# Patient Record
Sex: MALE | Race: BLACK OR AFRICAN AMERICAN | Employment: FULL TIME | URBAN - METROPOLITAN AREA
[De-identification: names, ages, dates, MRNs, and addresses within clinical notes are randomized per-mention and may not be internally consistent; named-entity substitution may affect disease eponyms.]

---

## 2022-02-25 ENCOUNTER — APPOINTMENT (OUTPATIENT)
Dept: GENERAL RADIOLOGY | Age: 47
End: 2022-02-25
Attending: PHYSICIAN ASSISTANT

## 2022-02-25 ENCOUNTER — HOSPITAL ENCOUNTER (EMERGENCY)
Age: 47
Discharge: HOME | End: 2022-02-25
Attending: STUDENT IN AN ORGANIZED HEALTH CARE EDUCATION/TRAINING PROGRAM

## 2022-02-25 VITALS
BODY MASS INDEX: 26.79 KG/M2 | SYSTOLIC BLOOD PRESSURE: 135 MMHG | OXYGEN SATURATION: 100 % | HEART RATE: 78 BPM | TEMPERATURE: 98.2 F | HEIGHT: 76 IN | DIASTOLIC BLOOD PRESSURE: 74 MMHG | WEIGHT: 220 LBS | RESPIRATION RATE: 16 BRPM

## 2022-02-25 DIAGNOSIS — T07.XXXA MUSCLE STRAIN, MULTIPLE SITES: ICD-10-CM
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
## 2022-02-25 PROCEDURE — 73140 X-RAY EXAM OF FINGER(S): CPT

## 2022-02-25 PROCEDURE — 72050 X-RAY EXAM NECK SPINE 4/5VWS: CPT

## 2022-02-25 PROCEDURE — 74011250637 HC RX REV CODE- 250/637: Performed by: PHYSICIAN ASSISTANT

## 2022-02-25 PROCEDURE — 99283 EMERGENCY DEPT VISIT LOW MDM: CPT

## 2022-02-25 PROCEDURE — 71100 X-RAY EXAM RIBS UNI 2 VIEWS: CPT

## 2022-02-25 PROCEDURE — 73030 X-RAY EXAM OF SHOULDER: CPT

## 2022-02-25 RX ORDER — CYCLOBENZAPRINE HCL 10 MG
10 TABLET ORAL
Status: COMPLETED | OUTPATIENT
Start: 2022-02-25 | End: 2022-02-25

## 2022-02-25 RX ORDER — CYCLOBENZAPRINE HCL 10 MG
10 TABLET ORAL
Qty: 15 TABLET | Refills: 0 | Status: SHIPPED | OUTPATIENT
Start: 2022-02-25 | End: 2022-03-02

## 2022-02-25 RX ORDER — OXYCODONE HYDROCHLORIDE 5 MG/1
5 TABLET ORAL
Status: COMPLETED | OUTPATIENT
Start: 2022-02-25 | End: 2022-02-25

## 2022-02-25 RX ORDER — ACETAMINOPHEN 500 MG
1000 TABLET ORAL
Qty: 30 TABLET | Refills: 0 | Status: SHIPPED | OUTPATIENT
Start: 2022-02-25

## 2022-02-25 RX ORDER — ACETAMINOPHEN 500 MG
1000 TABLET ORAL ONCE
Status: COMPLETED | OUTPATIENT
Start: 2022-02-25 | End: 2022-02-25

## 2022-02-25 RX ORDER — OXYCODONE HYDROCHLORIDE 5 MG/1
5 TABLET ORAL
Qty: 5 TABLET | Refills: 0 | Status: SHIPPED | OUTPATIENT
Start: 2022-02-25 | End: 2022-02-27

## 2022-02-25 RX ADMIN — OXYCODONE 5 MG: 5 TABLET ORAL at 11:18

## 2022-02-25 RX ADMIN — CYCLOBENZAPRINE HYDROCHLORIDE 10 MG: 10 TABLET, FILM COATED ORAL at 11:17

## 2022-02-25 RX ADMIN — ACETAMINOPHEN 1000 MG: 500 TABLET ORAL at 09:54

## 2022-02-25 NOTE — Clinical Note
[address]  [facility] EMERGENCY DEPT  [address] [address]  [phone]    Work/School Note    Date: 2/25/2022    To Whom It May concern:    Griselda Skates was seen and treated today in the emergency room by the following provider(s):  Attending Provider: Bethanie Dandy, MD  Physician Assistant: Madison Dexter. Griselda Skates is excused from work/school on 2/25/2022 through 2/27/2022. He is medically clear to return to work/school on 2/28/2022.          Sincerely,          Madison Bernal

## 2022-02-25 NOTE — ED PROVIDER NOTES
EMERGENCY DEPARTMENT HISTORY AND PHYSICAL EXAM      Date: 2/25/2022  Patient Name: Remington Diamond    History of Presenting Illness     Chief Complaint   Patient presents with   Sheyla Tovar Fall     a grate gave way and he fell when at work he caught the fall with his arms. pain right side ribs. no loc. pain fifth finger as well       History Provided By: Patient. Patient's wife arrived after initial evaluation. HPI: Remington Diamond, 56 y.o. male with significant PMHx for CKD presents BIBA to the ED with cc of fall that occurred at work. 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 Patient reports falling into a hole that went up to about his armpits. No head injury or LOC. He complains of pain at the right fifth digit and feels like it may be broken. He also complains of some soreness along the right posterior ribs. Denies headache, dizziness, nausea/vomiting, chest pain, shortness of breath, abdominal pain. He is not anticoagulated. No medications prior to arrival. [facility] of R rotator cuff surgery with surgeon at [address] [place] [address]. Patient does not take NSAIDs due to CKD, which is reportedly stage 2. There are no other complaints, changes, or physical findings at this time. PCP: Jaguar Romero MD    No current facility-administered medications on file prior to encounter. No current outpatient medications on file prior to encounter. Past History     Past Medical History:  No past medical history on file. Past Surgical History:  No past surgical history on file. Family History:  No family history on file.     Social History:  Social History     Tobacco Use    Smoking status: Not on file    Smokeless tobacco: Not on file   Substance Use Topics    Alcohol use: Not on file    Drug use: Not on file       Allergies:  No Known Allergies      Review of Systems   Review of Systems Constitutional: Negative for diaphoresis. HENT: Negative for voice change. Eyes: Negative for redness. Respiratory: Negative for shortness of breath. Cardiovascular: Negative for chest pain. Gastrointestinal: Negative for abdominal pain and vomiting. Musculoskeletal: Positive for arthralgias and back pain. Skin: Negative for wound. Allergic/Immunologic: Negative for immunocompromised state. Neurological: Negative for dizziness. Hematological: Does not bruise/bleed easily. Psychiatric/Behavioral: Negative for agitation. Physical Exam   Physical Exam  Vitals and nursing note reviewed. Constitutional:       General: He is not in acute distress. Appearance: Normal appearance. He is not toxic-appearing. Comments: Appears comfortable, sitting upright on stretcher eating an orange   HENT:      Head: Normocephalic and atraumatic. Nose: Nose normal.      Mouth/Throat:      Mouth: Mucous membranes are moist.   Eyes:      Extraocular Movements: Extraocular movements intact. Conjunctiva/sclera: Conjunctivae normal.      Pupils: Pupils are equal, round, and reactive to light. Neck:      Trachea: Phonation normal.   Cardiovascular:      Rate and Rhythm: Normal rate and regular rhythm. Pulmonary:      Effort: Pulmonary effort is normal.      Breath sounds: Normal breath sounds. Abdominal:      Palpations: Abdomen is soft. Tenderness: There is no abdominal tenderness. There is no guarding or rebound. Musculoskeletal:         General: Normal range of motion. Cervical back: Normal range of motion and neck supple. No rigidity or tenderness. Comments: 5/5 strength and sensation b/l UE/LEs. Gait is steady and symmetrical.  Generalized pain of the right fifth digit reported. There is no deformity or point tenderness. Full range of motion of the digit is performed with pain. No midline tenderness along length of spine.   Pain reported over the right lateral ribs.  There is no crepitus, step-off, or ecchymosis. Skin:     General: Skin is warm and dry. Neurological:      General: No focal deficit present. Mental Status: He is alert and oriented to person, place, and time. GCS: GCS eye subscore is 4. GCS verbal subscore is 5. GCS motor subscore is 6. Cranial Nerves: No cranial nerve deficit. Motor: No weakness. Coordination: Coordination normal.      Gait: Gait normal.   Psychiatric:         Mood and Affect: Mood normal.         Behavior: Behavior normal.         Diagnostic Study Results     Labs -   No results found for this or any previous visit (from the past 12 hour(s)). Radiologic Studies -   XR 5TH FINGER RT MIN 2 V   Final Result   No acute abnormality. XR RIBS RT UNI 2 V   Final Result   No displaced fracture. XR SPINE CERV 4 OR 5 V   Final Result   Degenerative disease as described above. XR SHOULDER RT AP/LAT MIN 2 V   Final Result   No acute abnormality. CT Results  (Last 48 hours)    None        CXR Results  (Last 48 hours)    None            Medical Decision Making   I am the first provider for this patient. I reviewed the vital signs, available nursing notes, past medical history, past surgical history, family history and social history. Vital Signs-Reviewed the patient's vital signs. Patient Vitals for the past 12 hrs:   Temp Pulse Resp BP SpO2   02/25/22 1057 98.2 °F (36.8 °C) 78 16 135/74 --   02/25/22 0851 98.1 °F (36.7 °C) 78 14 (!) 142/88 100 %       Records Reviewed: Nursing Notes and Old Medical Records    Provider Notes (Medical Decision Making):     ED Course:   Initial assessment performed. The patients presenting problems have been discussed, and they are in agreement with the care plan formulated and outlined with them. I have encouraged them to ask questions as they arise throughout their visit.     ED Course as of 02/25/22 1206   Fri Feb 25, 2022   8650 Patient's wife is requesting additional imaging and pain medication. She states \"I know my . \"  She is requesting Tylenol 3, as well as x-rays of the neck and right shoulder. Patient appears comfortable he is sitting upright eating an orange and appears to be in no acute distress. Long discussion regarding risks/benefits/side effects of additional films and medications. We will add on x-rays of the neck and shoulder, hold off on additional analgesics at this time as patient was just given 1 g of Tylenol. [AS]   4607 Discussed x-ray findings with patient and his wife. They are agreeable to discharge home. Discussed expected evolution of symptoms. Recommended Tylenol, Flexeril, use of heating pad and supportive care at home. Patient's wife declines Voltaren gel, citing kidney concerns. Patient's wife is requesting something stronger to have on hand just in case. Will prescribe a short course of oxycodone to be added on as needed, advised on safe use. Follow-up with PCP. ED return precautions given.   [AS]   6145 Upon discharge, pt and wife are now requesting more pain meds. They also inquire about a sling. I advised against a sling d/t risk of frozen shoulder. I encouraged frequent ROM exercises. Will give oxycodone 5 mg and flexeril.  [AS]      ED Course User Index  [AS] SYDNIE Mejia       Critical Care Time: None    Disposition:  D/c    PLAN:  1. Discharge Medication List as of 2/25/2022 10:59 AM      START taking these medications    Details   acetaminophen (Tylenol Extra Strength) 500 mg tablet Take 2 Tablets by mouth every six (6) hours as needed for Pain., Normal, Disp-30 Tablet, R-0      cyclobenzaprine (FLEXERIL) 10 mg tablet Take 1 Tablet by mouth three (3) times daily as needed for Muscle Spasm(s) for up to 5 days. , Normal, Disp-15 Tablet, R-0      oxyCODONE IR (Roxicodone) 5 mg immediate release tablet Take 1 Tablet by mouth every eight (8) hours as needed for Pain for up to 2 days.  Max Daily Amount: 15 mg., Normal, Disp-5 Tablet, R-0           2. Follow-up Information     Follow up With Specialties Details Why Contact Info    [facility] EMERGENCY DEPT Emergency Medicine  As needed, If symptoms worsen [address] [facility] [address]    Beatriz Love MD Family Medicine Call  For follow up [address] [facility]  [place] [address] [address]  [facility]  Call  For follow up if needed [address]  [address] [facility],[address]  [address] [facility]  [phone]        Return to ED if worse     Diagnosis     Clinical Impression:   1. Fall from height of greater than 3 feet    2. Muscle strain, multiple sites          Please note that this dictation was completed with [employer], the computer voice recognition software. Quite often unanticipated grammatical, syntax, homophones, and other interpretive errors are inadvertently transcribed by the computer software. Please disregards these errors. Please excuse any errors that have escaped final proofreading.

## 2022-02-25 NOTE — ED NOTES
Entered patient room to discharge patient. Patient wife verbalized that her  was having worsened pain in his right shoulder and neck. Patient was asked to confirm his wife concerns. Patient stated his right shoulder is painful rating it 8/10 on pain scale. Patient denies any numbness and tingling sensation. Patient's wife asked to speak to attending PA Carleen Brock who was informed by RN. PA entered room and reassure patient and wife about his right arm injury, results of xray, medication, and to follow up with ortho. Patient was further medicated with pain medication and muscle relaxer (see MAR) and discharge instruction explained to both patient and wife, and both verbalizing understanding of instructions. Patient given discharge papers and left to dress for home.

## 2022-03-30 ENCOUNTER — TRANSCRIBE ORDER (OUTPATIENT)
Dept: SCHEDULING | Age: 47
End: 2022-03-30

## 2022-03-30 DIAGNOSIS — S20.211A CONTUSION OF RIGHT FRONT WALL OF THORAX: Primary | ICD-10-CM

## 2022-04-04 ENCOUNTER — TRANSCRIBE ORDER (OUTPATIENT)
Dept: SCHEDULING | Age: 47
End: 2022-04-04

## 2022-04-04 DIAGNOSIS — M25.519 PAIN IN SHOULDER: Primary | ICD-10-CM

## 2022-04-08 ENCOUNTER — HOSPITAL ENCOUNTER (OUTPATIENT)
Dept: MRI IMAGING | Age: 47
Discharge: HOME | End: 2022-04-08
Attending: ORTHOPAEDIC SURGERY
Payer: COMMERCIAL

## 2022-04-08 DIAGNOSIS — S20.211A CONTUSION OF RIGHT FRONT WALL OF THORAX: ICD-10-CM

## 2022-04-08 DIAGNOSIS — M25.519 PAIN IN SHOULDER: ICD-10-CM

## 2022-04-08 PROCEDURE — 73221 MRI JOINT UPR EXTREM W/O DYE: CPT

## 2022-04-08 PROCEDURE — 72141 MRI NECK SPINE W/O DYE: CPT
